# Patient Record
Sex: FEMALE | Race: WHITE | Employment: FULL TIME | URBAN - NONMETROPOLITAN AREA
[De-identification: names, ages, dates, MRNs, and addresses within clinical notes are randomized per-mention and may not be internally consistent; named-entity substitution may affect disease eponyms.]

---
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
## 2020-03-26 ENCOUNTER — NURSE ONLY (OUTPATIENT)
Dept: LAB | Age: 20
End: 2020-03-26

## 2020-03-26 LAB
ABO: NORMAL
ANTIBODY SCREEN: NORMAL
BASOPHILS # BLD: 0.3 %
BASOPHILS ABSOLUTE: 0 THOU/MM3 (ref 0–0.1)
EOSINOPHIL # BLD: 1.3 %
EOSINOPHILS ABSOLUTE: 0.1 THOU/MM3 (ref 0–0.4)
ERYTHROCYTE [DISTWIDTH] IN BLOOD BY AUTOMATED COUNT: 12.8 % (ref 11.5–14.5)
ERYTHROCYTE [DISTWIDTH] IN BLOOD BY AUTOMATED COUNT: 36.1 FL (ref 35–45)
HCT VFR BLD CALC: 43.5 % (ref 37–47)
HEMOGLOBIN: 13.7 GM/DL (ref 12–16)
HEPATITIS B SURFACE ANTIGEN: NEGATIVE
IMMATURE GRANS (ABS): 0.01 THOU/MM3 (ref 0–0.07)
IMMATURE GRANULOCYTES: 0.1 %
LYMPHOCYTES # BLD: 31 %
LYMPHOCYTES ABSOLUTE: 2.5 THOU/MM3 (ref 1–4.8)
MCH RBC QN AUTO: 24.9 PG (ref 26–33)
MCHC RBC AUTO-ENTMCNC: 31.5 GM/DL (ref 32.2–35.5)
MCV RBC AUTO: 78.9 FL (ref 81–99)
MONOCYTES # BLD: 9.5 %
MONOCYTES ABSOLUTE: 0.8 THOU/MM3 (ref 0.4–1.3)
NUCLEATED RED BLOOD CELLS: 0 /100 WBC
PLATELET # BLD: 283 THOU/MM3 (ref 130–400)
PMV BLD AUTO: 10.3 FL (ref 9.4–12.4)
RBC # BLD: 5.51 MILL/MM3 (ref 4.2–5.4)
RH FACTOR: NORMAL
RUBELLA: > 500 IU/ML
SEG NEUTROPHILS: 57.8 %
SEGMENTED NEUTROPHILS ABSOLUTE COUNT: 4.6 THOU/MM3 (ref 1.8–7.7)
TSH SERPL DL<=0.05 MIU/L-ACNC: < 0.005 UIU/ML (ref 0.4–4.2)
WBC # BLD: 8 THOU/MM3 (ref 4.8–10.8)

## 2020-03-27 LAB — RPR: NONREACTIVE

## 2020-03-31 LAB — T4 FREE: 6.69 NG/DL (ref 0.93–1.76)

## 2020-04-01 LAB
THYROGLOBULIN AB: < 0.9 IU/ML (ref 0–4)
THYROID PEROXIDASE ANTIBODY: 2065 IU/ML (ref 0–9)

## 2020-08-10 ENCOUNTER — HOSPITAL ENCOUNTER (OUTPATIENT)
Age: 20
Setting detail: SPECIMEN
Discharge: HOME | End: 2020-08-10
Payer: MEDICAID

## 2020-08-10 LAB
DIRECT EXAM: ABNORMAL
Lab: ABNORMAL
SPECIMEN DESCRIPTION: ABNORMAL

## 2020-08-11 LAB
C. TRACHOMATIS DNA ,URINE: NEGATIVE
N. GONORRHOEAE DNA, URINE: NEGATIVE
SOURCE: NORMAL
SPECIMEN DESCRIPTION: NORMAL
TRICHOMONAS VAGINALI, MOLECULAR: NEGATIVE

## 2020-11-19 ENCOUNTER — HOSPITAL ENCOUNTER (EMERGENCY)
Age: 20
Discharge: HOME | End: 2020-11-19
Payer: MEDICAID

## 2020-11-19 VITALS
OXYGEN SATURATION: 98 % | HEART RATE: 114 BPM | HEIGHT: 62 IN | WEIGHT: 180 LBS | RESPIRATION RATE: 16 BRPM | SYSTOLIC BLOOD PRESSURE: 138 MMHG | TEMPERATURE: 98.6 F | DIASTOLIC BLOOD PRESSURE: 70 MMHG | BODY MASS INDEX: 33.13 KG/M2

## 2020-11-19 PROCEDURE — U0003 INFECTIOUS AGENT DETECTION BY NUCLEIC ACID (DNA OR RNA); SEVERE ACUTE RESPIRATORY SYNDROME CORONAVIRUS 2 (SARS-COV-2) (CORONAVIRUS DISEASE [COVID-19]), AMPLIFIED PROBE TECHNIQUE, MAKING USE OF HIGH THROUGHPUT TECHNOLOGIES AS DESCRIBED BY CMS-2020-01-R: HCPCS

## 2020-11-19 PROCEDURE — 99213 OFFICE O/P EST LOW 20 MIN: CPT

## 2020-11-19 PROCEDURE — 99213 OFFICE O/P EST LOW 20 MIN: CPT | Performed by: NURSE PRACTITIONER

## 2020-11-19 ASSESSMENT — ENCOUNTER SYMPTOMS
SHORTNESS OF BREATH: 0
SORE THROAT: 1
NAUSEA: 0
COUGH: 1
VOMITING: 0

## 2020-11-20 NOTE — ED PROVIDER NOTES
[facility]  Urgent Care Encounter       CHIEF COMPLAINT       Chief Complaint   Patient presents with    Pharyngitis    Headache    Other     Lost taste and smell       Nurses Notes reviewed and I agree except as noted in the HPI. HISTORY OF PRESENT ILLNESS   Sidney Ashley Brandi Tafoya is a 23 y.o. female who presents for evaluation of sore throat, mild cough, congestion, chills, and loss of taste and smell. Patient states that the symptoms began 2 days ago but the loss of taste and smell started today. States that she has a roommate with similar symptoms who is also being tested for Covid. She denies any significant shortness of breath or recorded fevers. States that she has not been taking any medications for this at home. The history is provided by the patient. REVIEW OF SYSTEMS     Review of Systems   Constitutional: Positive for chills. Negative for fever. HENT: Positive for congestion and sore throat. Respiratory: Positive for cough. Negative for shortness of breath. Cardiovascular: Negative for chest pain. Gastrointestinal: Negative for nausea and vomiting. Musculoskeletal: Negative for arthralgias and myalgias. Skin: Negative for rash. Allergic/Immunologic: Negative for immunocompromised state. Neurological: Positive for headaches. PAST MEDICAL HISTORY   History reviewed. No pertinent past medical history. SURGICALHISTORY     Patient  has a past surgical history that includes Tympanostomy tube placement and Adenoidectomy. CURRENT MEDICATIONS       Previous Medications    No medications on file       ALLERGIES     Patient is has No Known Allergies. Patients   There is no immunization history on file for this patient. FAMILY HISTORY     Patient's family history is not on file. SOCIAL HISTORY     Patient  reports that she has never smoked.  She has never used smokeless tobacco. She reports that she does not drink alcohol or use drugs.    PHYSICAL EXAM     ED TRIAGE VITALS  BP: 138/70, Temp: 98.6 °F (37 °C), Pulse: 114, Resp: 16, SpO2: 98 %,Estimated body mass index is 32.92 kg/m² as calculated from the following:    Height as of this encounter: 5' 2\" (1.575 m). Weight as of this encounter: 180 lb (81.6 kg). ,Patient's last menstrual period was 11/01/2020. Physical Exam  Vitals signs and nursing note reviewed. Constitutional:       General: She is not in acute distress. Appearance: She is well-developed. She is not diaphoretic. HENT:      Right Ear: Tympanic membrane and ear canal normal.      Left Ear: Tympanic membrane and ear canal normal.      Mouth/Throat:      Mouth: Mucous membranes are moist.      Pharynx: Oropharynx is clear. Eyes:      Conjunctiva/sclera:      Right eye: Right conjunctiva is not injected. Left eye: Left conjunctiva is not injected. Pupils: Pupils are equal.   Neck:      Musculoskeletal: Normal range of motion. Cardiovascular:      Rate and Rhythm: Normal rate and regular rhythm. Heart sounds: No murmur. Pulmonary:      Effort: Pulmonary effort is normal. No respiratory distress. Breath sounds: Normal breath sounds. Musculoskeletal:      Right knee: She exhibits normal range of motion. Left knee: She exhibits normal range of motion. Lymphadenopathy:      Head:      Right side of head: No tonsillar adenopathy. Left side of head: No tonsillar adenopathy. Cervical: No cervical adenopathy. Skin:     General: Skin is warm. Findings: No rash. Neurological:      Mental Status: She is alert and oriented to person, place, and time. Psychiatric:         Behavior: Behavior normal.         DIAGNOSTIC RESULTS     Labs:No results found for this visit on 11/19/20.     IMAGING:    No orders to display         EKG:      URGENT CARE COURSE:     Vitals:    11/19/20 1959   BP: 138/70   Pulse: 114   Resp: 16   Temp: 98.6 °F (37 °C)   TempSrc: Temporal   SpO2: 98% Weight: 180 lb (81.6 kg)   Height: 5' 2\" (1.575 m)       Medications - No data to display         PROCEDURES:  None    FINAL IMPRESSION      1. Suspected COVID-19 virus infection          DISPOSITION/ PLAN     Discussed with the patient that exam is consistent with a viral illness and that I believe testing for coronavirus is warranted at this time. Test was completed during visit today and patient is advised to quarantine until notified of results. Patient is advised to follow-up as directed by the health department if coronavirus would be detected. Advised to rest and hydrate and use over-the-counter antipyretic medications as needed for fever or body aches. Patient is advised to present to the ER for any worsening symptoms and is agreeable to plan as discussed. PATIENT REFERRED TO:  Ever Schaumann.  MARIAMA Hoffman - CNP  [address]  [address] / [facility] [address]      DISCHARGE MEDICATIONS:  New Prescriptions    No medications on file       Discontinued Medications    IBUPROFEN (ADVIL;MOTRIN) 600 MG TABLET    Take 1 tablet by mouth every 8 hours as needed for Pain       Current Discharge Medication List          MARIAMA Umana CNP    (Please note that portions of this note were completed with a voice recognition program. Efforts were made to edit the dictations but occasionally words are mis-transcribed.)          MARIAMA Umana CNP  11/19/20 2016

## 2020-11-20 NOTE — ED TRIAGE NOTES
Pt to [facility] ambulatory with sore throat, headache, and lost of taste and smell. This started 2 days ago. Pt wants tested for COVID-19.

## 2020-11-20 NOTE — ED NOTES
All discharge instructions and medications reviewed with pt at discharge. Instructed pt to go directly to main er if experiencing shortness of breath, chest pain, abdominal pain or if symptoms worsen. Pt verbalizes understanding. Ambulatory to lobby in stable condition.       Blanka Hayden RN  11/19/20 2017

## 2020-11-21 LAB — SARS-COV-2: DETECTED

## 2020-11-30 ENCOUNTER — HOSPITAL ENCOUNTER (EMERGENCY)
Age: 20
Discharge: HOME | End: 2020-11-30
Payer: MEDICAID

## 2020-11-30 VITALS
DIASTOLIC BLOOD PRESSURE: 78 MMHG | HEART RATE: 99 BPM | TEMPERATURE: 97.1 F | HEIGHT: 62 IN | RESPIRATION RATE: 18 BRPM | WEIGHT: 180 LBS | BODY MASS INDEX: 33.13 KG/M2 | OXYGEN SATURATION: 95 % | SYSTOLIC BLOOD PRESSURE: 133 MMHG

## 2020-11-30 PROCEDURE — 99213 OFFICE O/P EST LOW 20 MIN: CPT | Performed by: NURSE PRACTITIONER

## 2020-11-30 PROCEDURE — U0003 INFECTIOUS AGENT DETECTION BY NUCLEIC ACID (DNA OR RNA); SEVERE ACUTE RESPIRATORY SYNDROME CORONAVIRUS 2 (SARS-COV-2) (CORONAVIRUS DISEASE [COVID-19]), AMPLIFIED PROBE TECHNIQUE, MAKING USE OF HIGH THROUGHPUT TECHNOLOGIES AS DESCRIBED BY CMS-2020-01-R: HCPCS

## 2020-11-30 PROCEDURE — 99213 OFFICE O/P EST LOW 20 MIN: CPT

## 2020-11-30 ASSESSMENT — ENCOUNTER SYMPTOMS
DIARRHEA: 0
NAUSEA: 0
STRIDOR: 0
CHEST TIGHTNESS: 0
ABDOMINAL PAIN: 0
SINUS PRESSURE: 0
APNEA: 0
SORE THROAT: 0
RHINORRHEA: 0
COUGH: 0
WHEEZING: 0
SHORTNESS OF BREATH: 0
CONSTIPATION: 0
VOMITING: 0
SINUS PAIN: 0
CHOKING: 0

## 2020-11-30 NOTE — ED PROVIDER NOTES
[place] [address]  Urgent Care Encounter      CHIEF COMPLAINT       Chief Complaint   Patient presents with    Covid Testing     return to work        Nurses Notes reviewed and I agree except as noted in the HPI. HISTORY OFPRESENT ILLNESS   Sidney Tucker is a 23 y.o. Denies symptoms at this time reports need for a negative test prior to returning to work. The history is provided by the patient. No  was used. REVIEW OF SYSTEMS     Review of Systems   Constitutional: Negative for activity change, appetite change, chills, diaphoresis, fatigue, fever and unexpected weight change. HENT: Negative for congestion, ear pain, postnasal drip, rhinorrhea, sinus pressure, sinus pain, sneezing and sore throat. Eyes: Negative for visual disturbance. Respiratory: Negative for apnea, cough, choking, chest tightness, shortness of breath, wheezing and stridor. Cardiovascular: Negative for chest pain, palpitations and leg swelling. Gastrointestinal: Negative for abdominal pain, constipation, diarrhea, nausea and vomiting. Neurological: Negative for dizziness, weakness, light-headedness, numbness and headaches. PAST MEDICAL HISTORY   History reviewed. No pertinent past medical history. SURGICAL HISTORY     Patient  has a past surgical history that includes Tympanostomy tube placement and Adenoidectomy. CURRENT MEDICATIONS       There are no discharge medications for this patient. ALLERGIES     Patient is has No Known Allergies. FAMILY HISTORY     Patient's family history is not on file. SOCIAL HISTORY     Patient  reports that she has never smoked. She has never used smokeless tobacco. She reports that she does not drink alcohol or use drugs. PHYSICAL EXAM     ED TRIAGE VITALS  BP: 133/78, Temp: 97.1 °F (36.2 °C), Pulse: 99, Resp: 18, SpO2: 95 %  Physical Exam  Vitals signs and nursing note reviewed.    Constitutional:       General: She is not in acute distress. Appearance: Normal appearance. She is not ill-appearing, toxic-appearing or diaphoretic. HENT:      Head: Normocephalic and atraumatic. Right Ear: External ear normal.      Left Ear: External ear normal.   Eyes:      Extraocular Movements: Extraocular movements intact. Conjunctiva/sclera: Conjunctivae normal.   Neck:      Musculoskeletal: Normal range of motion. Pulmonary:      Effort: Pulmonary effort is normal. No respiratory distress. Breath sounds: No stridor. No wheezing, rhonchi or rales. Chest:      Chest wall: No tenderness. Musculoskeletal: Normal range of motion. Neurological:      General: No focal deficit present. Mental Status: She is alert and oriented to person, place, and time. Psychiatric:         Mood and Affect: Mood normal.         Behavior: Behavior normal.         Thought Content: Thought content normal.         Judgment: Judgment normal.         DIAGNOSTIC RESULTS   Labs:No results found for this visit on 11/30/20. IMAGING:  No orders to display     URGENT CARE COURSE:     Vitals:    11/30/20 0827   BP: 133/78   Pulse: 99   Resp: 18   Temp: 97.1 °F (36.2 °C)   TempSrc: Tympanic   SpO2: 95%   Weight: 180 lb (81.6 kg)   Height: 5' 2\" (1.575 m)       Medications - No data to display  PROCEDURES:  None  FINAL IMPRESSION      1. COVID-19        DISPOSITION/PLAN   Decision To Discharge    Patient is to self isolate until COVID-19 test result is known. If applicable, work/school note has been provided to be off until COVID-19 test results. Follow with family provider for further work/school note needs. May take over-the-counter vitamins if no contraindications:  Vitamin C 500 mg by mouth twice daily  B Complex vitamin by mouth daily   Zinc 50 mg by mouth daily   Vitamin D 3000 u/day    If you develop severe respiratory distress (trouble breathing) or have a medical emergency, go to your nearest emergency room or call 911.       PATIENT REFERRED TO:  MARIAMA Rosas - CNP  [address]  [address]  [address] [facility]  [phone]    Schedule an appointment as soon as possible for a visit in 5 days  Follow up within 5 days, Return to ED sooner if symptoms worsen    DISCHARGE MEDICATIONS:  There are no discharge medications for this patient. There are no discharge medications for this patient.       MARIAMA Medina - MARIAMA Worrell CNP  11/30/20 8257

## 2020-12-01 ENCOUNTER — CARE COORDINATION (OUTPATIENT)
Dept: CARE COORDINATION | Age: 20
End: 2020-12-01

## 2020-12-03 LAB — SARS-COV-2: NOT DETECTED

## 2021-02-03 ENCOUNTER — APPOINTMENT (OUTPATIENT)
Dept: GENERAL RADIOLOGY | Age: 21
End: 2021-02-03
Payer: MEDICAID

## 2021-02-03 ENCOUNTER — HOSPITAL ENCOUNTER (EMERGENCY)
Age: 21
Discharge: HOME | End: 2021-02-03
Payer: MEDICAID

## 2021-02-03 VITALS
TEMPERATURE: 98.3 F | SYSTOLIC BLOOD PRESSURE: 107 MMHG | DIASTOLIC BLOOD PRESSURE: 79 MMHG | HEART RATE: 98 BPM | OXYGEN SATURATION: 98 % | WEIGHT: 180 LBS | RESPIRATION RATE: 15 BRPM | HEIGHT: 62 IN | BODY MASS INDEX: 33.13 KG/M2

## 2021-02-03 DIAGNOSIS — E05.00 GRAVES DISEASE: Primary | ICD-10-CM

## 2021-02-03 DIAGNOSIS — Z91.199 POOR COMPLIANCE: ICD-10-CM

## 2021-02-03 LAB
ALBUMIN SERPL-MCNC: 3.4 G/DL (ref 3.5–5.1)
ALP BLD-CCNC: 138 U/L (ref 38–126)
ALT SERPL-CCNC: 35 U/L (ref 11–66)
ANION GAP SERPL CALCULATED.3IONS-SCNC: 9 MEQ/L (ref 8–16)
AST SERPL-CCNC: 26 U/L (ref 5–40)
BASOPHILS # BLD: 0.2 %
BASOPHILS ABSOLUTE: 0 THOU/MM3 (ref 0–0.1)
BILIRUB SERPL-MCNC: 0.5 MG/DL (ref 0.3–1.2)
BUN BLDV-MCNC: 8 MG/DL (ref 7–22)
CALCIUM SERPL-MCNC: 9.7 MG/DL (ref 8.5–10.5)
CHLORIDE BLD-SCNC: 109 MEQ/L (ref 98–111)
CO2: 26 MEQ/L (ref 23–33)
CREAT SERPL-MCNC: 0.3 MG/DL (ref 0.4–1.2)
EKG ATRIAL RATE: 112 BPM
EKG P AXIS: 64 DEGREES
EKG P-R INTERVAL: 150 MS
EKG Q-T INTERVAL: 342 MS
EKG QRS DURATION: 74 MS
EKG QTC CALCULATION (BAZETT): 466 MS
EKG R AXIS: 65 DEGREES
EKG T AXIS: 52 DEGREES
EKG VENTRICULAR RATE: 112 BPM
EOSINOPHIL # BLD: 0.9 %
EOSINOPHILS ABSOLUTE: 0 THOU/MM3 (ref 0–0.4)
ERYTHROCYTE [DISTWIDTH] IN BLOOD BY AUTOMATED COUNT: 13.1 % (ref 11.5–14.5)
ERYTHROCYTE [DISTWIDTH] IN BLOOD BY AUTOMATED COUNT: 35.8 FL (ref 35–45)
GFR SERPL CREATININE-BSD FRML MDRD: > 90 ML/MIN/1.73M2
GLUCOSE BLD-MCNC: 107 MG/DL (ref 70–108)
HCT VFR BLD CALC: 38.3 % (ref 37–47)
HEMOGLOBIN: 12.5 GM/DL (ref 12–16)
IMMATURE GRANS (ABS): 0.01 THOU/MM3 (ref 0–0.07)
IMMATURE GRANULOCYTES: 0.2 %
LYMPHOCYTES # BLD: 43.9 %
LYMPHOCYTES ABSOLUTE: 2.4 THOU/MM3 (ref 1–4.8)
MAGNESIUM: 1.7 MG/DL (ref 1.6–2.4)
MCH RBC QN AUTO: 25.2 PG (ref 26–33)
MCHC RBC AUTO-ENTMCNC: 32.6 GM/DL (ref 32.2–35.5)
MCV RBC AUTO: 77.1 FL (ref 81–99)
MONOCYTES # BLD: 9.7 %
MONOCYTES ABSOLUTE: 0.5 THOU/MM3 (ref 0.4–1.3)
NUCLEATED RED BLOOD CELLS: 0 /100 WBC
OSMOLALITY CALCULATION: 285.6 MOSMOL/KG (ref 275–300)
PLATELET # BLD: 244 THOU/MM3 (ref 130–400)
PMV BLD AUTO: 10.5 FL (ref 9.4–12.4)
POTASSIUM SERPL-SCNC: 3.9 MEQ/L (ref 3.5–5.2)
PREGNANCY, SERUM: NEGATIVE
RBC # BLD: 4.97 MILL/MM3 (ref 4.2–5.4)
SEG NEUTROPHILS: 45.1 %
SEGMENTED NEUTROPHILS ABSOLUTE COUNT: 2.4 THOU/MM3 (ref 1.8–7.7)
SODIUM BLD-SCNC: 144 MEQ/L (ref 135–145)
T4 FREE: 4.73 NG/DL (ref 0.93–1.76)
TOTAL PROTEIN: 7.2 G/DL (ref 6.1–8)
TROPONIN T: < 0.01 NG/ML
TSH SERPL DL<=0.05 MIU/L-ACNC: < 0.005 UIU/ML (ref 0.4–4.2)
WBC # BLD: 5.4 THOU/MM3 (ref 4.8–10.8)

## 2021-02-03 PROCEDURE — 83735 ASSAY OF MAGNESIUM: CPT

## 2021-02-03 PROCEDURE — 6370000000 HC RX 637 (ALT 250 FOR IP): Performed by: PHYSICIAN ASSISTANT

## 2021-02-03 PROCEDURE — 36415 COLL VENOUS BLD VENIPUNCTURE: CPT

## 2021-02-03 PROCEDURE — 84439 ASSAY OF FREE THYROXINE: CPT

## 2021-02-03 PROCEDURE — 84703 CHORIONIC GONADOTROPIN ASSAY: CPT

## 2021-02-03 PROCEDURE — 84443 ASSAY THYROID STIM HORMONE: CPT

## 2021-02-03 PROCEDURE — 84484 ASSAY OF TROPONIN QUANT: CPT

## 2021-02-03 PROCEDURE — 99284 EMERGENCY DEPT VISIT MOD MDM: CPT

## 2021-02-03 PROCEDURE — 80053 COMPREHEN METABOLIC PANEL: CPT

## 2021-02-03 PROCEDURE — 71045 X-RAY EXAM CHEST 1 VIEW: CPT

## 2021-02-03 PROCEDURE — 93005 ELECTROCARDIOGRAM TRACING: CPT | Performed by: PHYSICIAN ASSISTANT

## 2021-02-03 PROCEDURE — 93010 ELECTROCARDIOGRAM REPORT: CPT | Performed by: NUCLEAR MEDICINE

## 2021-02-03 PROCEDURE — 85025 COMPLETE CBC W/AUTO DIFF WBC: CPT

## 2021-02-03 RX ORDER — PROPRANOLOL HYDROCHLORIDE 20 MG/1
20 TABLET ORAL ONCE
Status: COMPLETED | OUTPATIENT
Start: 2021-02-03 | End: 2021-02-03

## 2021-02-03 RX ORDER — PROPRANOLOL HYDROCHLORIDE 20 MG/1
20 TABLET ORAL 3 TIMES DAILY
Qty: 90 TABLET | Refills: 0 | Status: ON HOLD | OUTPATIENT
Start: 2021-02-03 | End: 2022-06-22

## 2021-02-03 RX ORDER — METHIMAZOLE 10 MG/1
10 TABLET ORAL 3 TIMES DAILY
Qty: 90 TABLET | Refills: 0 | Status: SHIPPED | OUTPATIENT
Start: 2021-02-03 | End: 2021-03-05

## 2021-02-03 RX ORDER — LEVOTHYROXINE SODIUM 0.05 MG/1
50 TABLET ORAL DAILY
COMMUNITY
End: 2021-02-03

## 2021-02-03 RX ADMIN — PROPRANOLOL HYDROCHLORIDE 20 MG: 20 TABLET ORAL at 14:34

## 2021-02-03 ASSESSMENT — ENCOUNTER SYMPTOMS
VOMITING: 0
COUGH: 0
TROUBLE SWALLOWING: 0
DIARRHEA: 1
SHORTNESS OF BREATH: 1
COLOR CHANGE: 0
ABDOMINAL PAIN: 0
BACK PAIN: 0

## 2021-02-03 NOTE — ED NOTES
Presents to ER via [facility] with complaints of high heart rate after having an anxiety attack. Pt states she has a history of Grave's Disease and was admitted last time she was here due to maintaining a high HR. HR upon arrival 110-115pbm. EKG completed. Pt denies any pain at this time. States her anxiety attack was around 1130 but she feels better at this time. Will continue to monitor.      Alex Hooper RN  02/03/21 0411

## 2021-02-03 NOTE — ED NOTES
Bed: 024A  Expected date:   Expected time:   Means of arrival: [facility]  Comments:     David Treviño RN  02/03/21 6584

## 2021-02-03 NOTE — ED NOTES
Pt resting in bed, medicated per MAR. Updated pt and family on plan of care. Call light within reach.       Nissa Hayes RN  02/03/21 1791

## 2021-02-26 ENCOUNTER — HOSPITAL ENCOUNTER (OUTPATIENT)
Age: 21
Setting detail: SPECIMEN
Discharge: HOME | End: 2021-02-26
Payer: MEDICAID

## 2021-02-27 LAB
DIRECT EXAM: NORMAL
Lab: NORMAL
SPECIMEN DESCRIPTION: NORMAL

## 2021-02-28 LAB
SOURCE: NORMAL
TRICHOMONAS VAGINALI, MOLECULAR: NEGATIVE

## 2021-03-01 LAB
C. TRACHOMATIS DNA ,URINE: NEGATIVE
N. GONORRHOEAE DNA, URINE: NEGATIVE
SPECIMEN DESCRIPTION: NORMAL

## 2021-09-18 NOTE — ED PROVIDER NOTES
[facility]  eMERGENCY dEPARTMENT eNCOUnter          CHIEF COMPLAINT       Chief Complaint   Patient presents with    Anxiety       Nurses Notes reviewed and I agree except as noted inthe HPI. HISTORY OF PRESENT ILLNESS    Sidney Muirconnor Hay is a 23 y.o. female who presents to the Emergency Department for the evaluation of anxiety. Patient reports a history of hyperthyroidism secondary to Graves' disease. She has previously seen endocrinology for this and reports that she is currently on methimazole and another unknown medication for treatment which was prescribed by her endocrinologist in the fall. States she has had episodes of anxiety secondary to the hyperthyroidism during this period of time and has had discussion with her endocrinologist about the next step being thyroidectomy but she has not yet called a surgeon. She states that today she had a similar episode of chest pain and shortness of breath that is consistent with the episode she has had in the past but was more intense. This lasted approximately 20 minutes and was associated with sensation of heart racing and tremors. She also reports intermittent diarrhea over the past week. Reports episodes of daily anxiety which she feels have been controlled well with her medications until today. She states her symptoms today are similar to the symptoms she had in the fall when she was admitted for her hyperthyroidism. She reports some chronic difficulty swallowing and swelling of the anterior neck which are not worsened from her baseline. Denies any hoarseness or obstruction with swallowing. Denies any fevers. She does report 60 pound weight loss since August as well as some heat intolerance. She also notes intermittent palpitations over the past month. The HPI was provided by the patient. REVIEW OF SYSTEMS     Review of Systems   Constitutional: Negative for chills and fever. HENT: Negative for trouble swallowing. Respiratory: Positive for shortness of breath (resolved). Negative for cough. Cardiovascular: Positive for chest pain and palpitations. Negative for leg swelling. Gastrointestinal: Positive for diarrhea. Negative for abdominal pain and vomiting. Endocrine: Positive for heat intolerance. Genitourinary: Negative for dysuria. Musculoskeletal: Negative for back pain. Skin: Negative for color change. Neurological: Negative for syncope and headaches. PAST MEDICAL HISTORY    has no past medical history on file. SURGICAL HISTORY      has a past surgical history that includes Tympanostomy tube placement and Adenoidectomy. CURRENT MEDICATIONS       Discharge Medication List as of 2/3/2021  6:06 PM          ALLERGIES     has No Known Allergies. FAMILY HISTORY     has no family status information on file. family history is not on file. SOCIAL HISTORY      reports that she has never smoked. She has never used smokeless tobacco. She reports that she does not drink alcohol or use drugs. PHYSICAL EXAM     INITIAL VITALS:  height is 5' 2\" (1.575 m) and weight is 180 lb (81.6 kg). Her oral temperature is 98.3 °F (36.8 °C). Her blood pressure is 107/79 and her pulse is 98. Her respiration is 15 and oxygen saturation is 98%. Physical Exam  Vitals signs and nursing note reviewed. Constitutional:       Appearance: Normal appearance. HENT:      Head: Normocephalic and atraumatic. Eyes:      Conjunctiva/sclera: Conjunctivae normal.   Neck:      Musculoskeletal: Normal range of motion. Thyroid: Thyromegaly present. Trachea: Phonation normal.   Cardiovascular:      Rate and Rhythm: Regular rhythm. Tachycardia present. Pulses: Normal pulses. Heart sounds: Normal heart sounds. No murmur. Pulmonary:      Effort: Pulmonary effort is normal. No respiratory distress. Breath sounds: Normal breath sounds. No wheezing or rhonchi.    Abdominal:      Palpations: Abdomen is 02/03/21 1324 02/03/21 1431 02/03/21 1531 02/03/21 1651   BP: 135/80 131/76 127/73 107/79   Pulse: 114 118 105 98   Resp: 14 15 16 15   Temp: 98.3 °F (36.8 °C)      TempSrc: Oral      SpO2: 100% 99% 98% 98%   Weight: 180 lb (81.6 kg)      Height: 5' 2\" (1.575 m)         6:23 PM EST: The patient was seen and evaluated. Patient presents for complaints of worsened anxiety today. Reports daily anxiety symptoms secondary to her hyperthyroidism. States she is currently on methimazole and another medication that she cannot recall the name of but believes it starts with an S. After attempting to call multiple pharmacies at the patient reports she filled the medications that, we did find the actual pharmacy who reported she filled a 30-day prescription of propranolol 20 mg and methimazole 10 mg in September. After extensive discussion with the patient who did not appear to be very forthcoming with an explanation for why, it does sound as though she had a transient period where she was not taking the medication at all and states that for the past month she has been taking it once per day with improvement in symptoms. She has not contacted her PCP for this complaint and they were notified during the patient's stay to try to encourage timely follow-up. Patient's endocrinologist has retired and there is no local endocrinologist currently. She did arrive tachycardic and this resolved after giving her prescribed dose of propranolol. Cardiac enzyme was reassuring. TSH is undetectably low with free T4 4.73, improved from previous results in March 2020. Discussed with the patient the importance of compliance for controlling her symptoms and the potential health ramifications of poor compliance and uncontrolled symptoms. She verbalized understanding. Discussed the importance of continued monitoring by her PCP and referral for ENT for surgical management was provided as well.   Patient has no persistent symptoms after normal...

## 2021-11-08 ENCOUNTER — HOSPITAL ENCOUNTER (OUTPATIENT)
Age: 21
Setting detail: SPECIMEN
Discharge: HOME | End: 2021-11-08
Payer: MEDICAID

## 2021-11-09 LAB
C. TRACHOMATIS DNA ,URINE: NEGATIVE
DIRECT EXAM: NORMAL
Lab: NORMAL
N. GONORRHOEAE DNA, URINE: NEGATIVE
SPECIMEN DESCRIPTION: NORMAL
SPECIMEN DESCRIPTION: NORMAL

## 2021-12-21 ENCOUNTER — NURSE ONLY (OUTPATIENT)
Dept: LAB | Age: 21
End: 2021-12-21

## 2021-12-23 LAB
C. TRACHOMATIS DNA,THIN PREP: NEGATIVE
N. GONORRHOEAE DNA, THIN PREP: NEGATIVE
SOURCE: NORMAL

## 2021-12-24 LAB
APTIMA MEDIA TYPE: NORMAL
T. VAGINALIS SPECIMEN SOURCE: NORMAL
TRICHOMONAS VAGINALIS BY NAA: NEGATIVE

## 2022-01-03 LAB — CYTOLOGY THIN PREP PAP: NORMAL

## 2022-06-22 ENCOUNTER — HOSPITAL ENCOUNTER (OUTPATIENT)
Age: 22
Discharge: HOME | End: 2022-06-22
Attending: OBSTETRICS & GYNECOLOGY | Admitting: OBSTETRICS & GYNECOLOGY
Payer: MEDICAID

## 2022-06-22 VITALS
DIASTOLIC BLOOD PRESSURE: 88 MMHG | BODY MASS INDEX: 36.07 KG/M2 | SYSTOLIC BLOOD PRESSURE: 145 MMHG | OXYGEN SATURATION: 98 % | RESPIRATION RATE: 18 BRPM | HEART RATE: 112 BPM | HEIGHT: 62 IN | TEMPERATURE: 97.7 F | WEIGHT: 196 LBS

## 2022-06-22 PROBLEM — O26.90 PREGNANCY, COMPLICATED: Status: ACTIVE | Noted: 2022-06-22

## 2022-06-22 LAB
ALBUMIN SERPL-MCNC: 3 G/DL (ref 3.5–5.1)
ALP BLD-CCNC: 165 U/L (ref 38–126)
ALT SERPL-CCNC: 21 U/L (ref 11–66)
ANION GAP SERPL CALCULATED.3IONS-SCNC: 9 MEQ/L (ref 8–16)
AST SERPL-CCNC: 15 U/L (ref 5–40)
BILIRUB SERPL-MCNC: 0.4 MG/DL (ref 0.3–1.2)
BUN BLDV-MCNC: 6 MG/DL (ref 7–22)
CALCIUM SERPL-MCNC: 9.1 MG/DL (ref 8.5–10.5)
CHLORIDE BLD-SCNC: 104 MEQ/L (ref 98–111)
CO2: 25 MEQ/L (ref 23–33)
CREAT SERPL-MCNC: 0.2 MG/DL (ref 0.4–1.2)
CREATININE URINE: 128 MG/DL
ERYTHROCYTE [DISTWIDTH] IN BLOOD BY AUTOMATED COUNT: 14.4 % (ref 11.5–14.5)
ERYTHROCYTE [DISTWIDTH] IN BLOOD BY AUTOMATED COUNT: 38.2 FL (ref 35–45)
GFR SERPL CREATININE-BSD FRML MDRD: > 90 ML/MIN/1.73M2
GLUCOSE BLD-MCNC: 99 MG/DL (ref 70–108)
HCT VFR BLD CALC: 35.5 % (ref 37–47)
HEMOGLOBIN: 11.3 GM/DL (ref 12–16)
MCH RBC QN AUTO: 23.8 PG (ref 26–33)
MCHC RBC AUTO-ENTMCNC: 31.8 GM/DL (ref 32.2–35.5)
MCV RBC AUTO: 74.7 FL (ref 81–99)
PLATELET # BLD: 248 THOU/MM3 (ref 130–400)
PMV BLD AUTO: 10.8 FL (ref 9.4–12.4)
POTASSIUM SERPL-SCNC: 4.2 MEQ/L (ref 3.5–5.2)
PROT/CREAT RATIO, UR: 0.37
PROTEIN, URINE: 47.8 MG/DL
RBC # BLD: 4.75 MILL/MM3 (ref 4.2–5.4)
SODIUM BLD-SCNC: 138 MEQ/L (ref 135–145)
TOTAL PROTEIN: 6 G/DL (ref 6.1–8)
WBC # BLD: 9.6 THOU/MM3 (ref 4.8–10.8)

## 2022-06-22 PROCEDURE — 85027 COMPLETE CBC AUTOMATED: CPT

## 2022-06-22 PROCEDURE — 36415 COLL VENOUS BLD VENIPUNCTURE: CPT

## 2022-06-22 PROCEDURE — 80053 COMPREHEN METABOLIC PANEL: CPT

## 2022-06-22 PROCEDURE — 82570 ASSAY OF URINE CREATININE: CPT

## 2022-06-22 PROCEDURE — 84156 ASSAY OF PROTEIN URINE: CPT

## 2022-06-22 RX ORDER — PROPYLTHIOURACIL 50 MG/1
50 TABLET ORAL 3 TIMES DAILY
COMMUNITY

## 2022-06-22 RX ORDER — ATENOLOL 25 MG/1
25 TABLET ORAL DAILY
COMMUNITY

## 2022-06-22 NOTE — FLOWSHEET NOTE
Pt arrives in wheelchair from dr Ford Lindsey office, she called with report and orders prior to her arrival, pt in office for routine office appointment, has Graves disease, elevated bp in office today, 1+ protein in office and a reactive nst, denies headache, visual changes, and epigastric pain, has been feeling baby move, denies cramping and noé, orders for labs, bp and fetal monitoring, pt to bathroom and specimen to lab for protein creatinine ratio, plan of care discussed

## 2022-06-22 NOTE — FLOWSHEET NOTE
Dr Ivelisse Patel phoned in and had viewed all labs and bp's, she is contacting [facility] about her thyroid disease and will call me back

## 2022-06-22 NOTE — FLOWSHEET NOTE
Pt verbalizes understanding of discharge home and to go to ER at [facility] tomorrow and ask to be taken to labor and delivery triage

## 2022-09-12 NOTE — PROCEDURES
Department of Obstetrics and Gynecology  Labor and Delivery  NST Triage Note      Pt Name: Lindsey Whyte  MRN: [id number] Leahlysprakash #: [de-identified]  YOB: 2000  Procedure Performed By: Freeman Aguilar MD, MD        SUBJECTIVE: Patient presented at 35 weeks complaining of elevated BP from the office. + fetal movement. denies contraction, vaginal bleeding and  leaking fluid. OBJECTIVE    Vitals:  BP (!) 145/88   Pulse (!) 112   Temp 97.7 °F (36.5 °C) (Oral)   Resp 18   Ht 5' 2\" (1.575 m)   Wt 196 lb (88.9 kg)   SpO2 98%   BMI 35.85 kg/m²     Fetal heart rate:         Baseline Heart Rate:  14        Accelerations:  present       Decelerations:  absent       Variability:  moderate    Contraction frequency: q10    The NST was reactive. Labs:   PEC labs normal    ASSESSMENT & PLAN:  Patient was  examined by me. Plan is for her to go to [address] [place] [address] after being discharged for triage assessment/consult as she has missed her scheduled outpatient appointments/consults for Grave's disease and uncontrolled hyperthryoidism. Reassurance provided. Labor precautions given. Discharged to home in a stable condition and instructed to follow up at her next scheduled appointment.     Freeman Aguilar MD 9/12/2022 8:30 AM